# Patient Record
Sex: FEMALE | Race: WHITE | NOT HISPANIC OR LATINO | URBAN - METROPOLITAN AREA
[De-identification: names, ages, dates, MRNs, and addresses within clinical notes are randomized per-mention and may not be internally consistent; named-entity substitution may affect disease eponyms.]

---

## 2017-06-14 ENCOUNTER — OFFICE VISIT - RIVER FALLS (OUTPATIENT)
Dept: FAMILY MEDICINE | Facility: CLINIC | Age: 62
End: 2017-06-14

## 2017-06-14 ASSESSMENT — MIFFLIN-ST. JEOR: SCORE: 1213.56

## 2017-07-10 ENCOUNTER — AMBULATORY - RIVER FALLS (OUTPATIENT)
Dept: FAMILY MEDICINE | Facility: CLINIC | Age: 62
End: 2017-07-10

## 2017-07-11 LAB
CHOLEST SERPL-MCNC: 180 MG/DL (ref 125–200)
CHOLEST/HDLC SERPL: 2.6 {RATIO}
HDLC SERPL-MCNC: 69 MG/DL
LDLC SERPL CALC-MCNC: 99 MG/DL
NONHDLC SERPL-MCNC: 111 MG/DL
TRIGL SERPL-MCNC: 59 MG/DL

## 2017-08-14 ENCOUNTER — OFFICE VISIT - RIVER FALLS (OUTPATIENT)
Dept: FAMILY MEDICINE | Facility: CLINIC | Age: 62
End: 2017-08-14

## 2017-09-26 ENCOUNTER — OFFICE VISIT - RIVER FALLS (OUTPATIENT)
Dept: FAMILY MEDICINE | Facility: CLINIC | Age: 62
End: 2017-09-26

## 2017-09-26 ASSESSMENT — MIFFLIN-ST. JEOR: SCORE: 1168.2

## 2018-07-31 ENCOUNTER — OFFICE VISIT - RIVER FALLS (OUTPATIENT)
Dept: FAMILY MEDICINE | Facility: CLINIC | Age: 63
End: 2018-07-31

## 2018-07-31 ASSESSMENT — MIFFLIN-ST. JEOR: SCORE: 1168.2

## 2018-08-14 ENCOUNTER — AMBULATORY - RIVER FALLS (OUTPATIENT)
Dept: FAMILY MEDICINE | Facility: CLINIC | Age: 63
End: 2018-08-14

## 2018-08-14 ENCOUNTER — OFFICE VISIT - RIVER FALLS (OUTPATIENT)
Dept: FAMILY MEDICINE | Facility: CLINIC | Age: 63
End: 2018-08-14

## 2018-08-14 ASSESSMENT — MIFFLIN-ST. JEOR: SCORE: 1168.2

## 2018-08-28 ENCOUNTER — OFFICE VISIT - RIVER FALLS (OUTPATIENT)
Dept: FAMILY MEDICINE | Facility: CLINIC | Age: 63
End: 2018-08-28

## 2018-08-28 ASSESSMENT — MIFFLIN-ST. JEOR: SCORE: 1168.2

## 2018-10-22 ENCOUNTER — OFFICE VISIT - RIVER FALLS (OUTPATIENT)
Dept: FAMILY MEDICINE | Facility: CLINIC | Age: 63
End: 2018-10-22

## 2018-10-22 ASSESSMENT — MIFFLIN-ST. JEOR: SCORE: 1219.01

## 2019-05-06 ENCOUNTER — OFFICE VISIT - RIVER FALLS (OUTPATIENT)
Dept: FAMILY MEDICINE | Facility: CLINIC | Age: 64
End: 2019-05-06

## 2019-05-06 ASSESSMENT — MIFFLIN-ST. JEOR: SCORE: 1223.54

## 2020-10-05 ENCOUNTER — OFFICE VISIT - RIVER FALLS (OUTPATIENT)
Dept: FAMILY MEDICINE | Facility: CLINIC | Age: 65
End: 2020-10-05

## 2022-02-11 VITALS
WEIGHT: 168 LBS | TEMPERATURE: 97.8 F | SYSTOLIC BLOOD PRESSURE: 180 MMHG | BODY MASS INDEX: 32.98 KG/M2 | DIASTOLIC BLOOD PRESSURE: 100 MMHG | HEIGHT: 60 IN | HEART RATE: 60 BPM

## 2022-02-11 VITALS
HEART RATE: 104 BPM | HEIGHT: 60 IN | WEIGHT: 155.8 LBS | BODY MASS INDEX: 30.59 KG/M2 | OXYGEN SATURATION: 96 % | DIASTOLIC BLOOD PRESSURE: 92 MMHG | HEART RATE: 79 BPM | OXYGEN SATURATION: 98 % | HEIGHT: 60 IN | BODY MASS INDEX: 30.59 KG/M2 | WEIGHT: 155.8 LBS | HEIGHT: 60 IN | TEMPERATURE: 98 F | SYSTOLIC BLOOD PRESSURE: 144 MMHG | BODY MASS INDEX: 30.59 KG/M2 | WEIGHT: 155.8 LBS | TEMPERATURE: 97.2 F

## 2022-02-11 VITALS
SYSTOLIC BLOOD PRESSURE: 146 MMHG | BODY MASS INDEX: 32.79 KG/M2 | HEART RATE: 76 BPM | DIASTOLIC BLOOD PRESSURE: 108 MMHG | HEIGHT: 60 IN | WEIGHT: 167 LBS | TEMPERATURE: 96.8 F

## 2022-02-11 VITALS
DIASTOLIC BLOOD PRESSURE: 80 MMHG | OXYGEN SATURATION: 96 % | WEIGHT: 155.8 LBS | SYSTOLIC BLOOD PRESSURE: 124 MMHG | BODY MASS INDEX: 30.59 KG/M2 | HEART RATE: 92 BPM | HEIGHT: 60 IN | TEMPERATURE: 98.6 F

## 2022-02-11 VITALS
WEIGHT: 162.8 LBS | SYSTOLIC BLOOD PRESSURE: 124 MMHG | HEART RATE: 88 BPM | DIASTOLIC BLOOD PRESSURE: 78 MMHG | SYSTOLIC BLOOD PRESSURE: 112 MMHG | WEIGHT: 165.8 LBS | HEART RATE: 68 BPM | BODY MASS INDEX: 32.55 KG/M2 | DIASTOLIC BLOOD PRESSURE: 76 MMHG | BODY MASS INDEX: 31.79 KG/M2 | HEIGHT: 60 IN

## 2022-02-16 NOTE — PROGRESS NOTES
Patient:   CHEN ADAM            MRN: [id number]            FIN: [id number]               Age:   63 years     Sex:  Female     :  1955   Associated Diagnoses:   Changing pigmented skin lesion   Author:   Mehul Mckeon MD      Impression and Plan   Diagnosis     Changing pigmented skin lesion ([id number] L81.9).     Orders      (Selected)   Outpatient Orders  Ordered (Dispatched)  Tissue pathology* ([facility]): Specimen Type: Miscellaneous Specimen, Collection Date: 18 14:34:00 CDT  Completed  77884 shaving skin lesion 1 s/n/h/f/g diam 1.1-2.0 cm (Charge): Quantity: 1, Changing pigmented skin lesion.     Counseled:  Patient, Regarding diagnosis, Regarding treatment.       Visit Information      Date of Service: 2018 02:00 pm  Performing Location: [facility]  Encounter#: [id number]      Primary Care Provider (PCP):  Mehul Mckeon MD    NPI# [id number]   Visit type:  New symptom.    Accompanied by:  No one.    Source of history:  Self.    History limitation:  None.       Procedure   Biopsy procedure   Date/ Time:  2018 2:35:00 PM.     Confirmed: patient, procedure, side, site, safety procedures followed.     Performed by: Mehul Mckeon MD.     Informed consent: signed by patient.     Indication: abnormal physical findings, suspected malignancy.     Preparation and technique: skin prepped (in usual sterile fashion, with alcohol), sterile preparation of site, local anesthesia 1% lidocaine without epinephrine 2.0  ml, technique (shave biopsy, number 10 scalpel), hemostasis achieved using electrocautery.     Site #  1: back, size and depth (length  1.5  cm, width  1.2  cm, superficial), specimen sent to pathology in preservative.     Completion: 1  lesions biopsied, estimated blood loss minimal, dressing applied adhesive strip.     Procedure tolerated: well.     wound care instructions given.

## 2022-02-16 NOTE — PROGRESS NOTES
Patient:   CHEN ADAM            MRN: [id number]            FIN: [id number]               Age:   63 years     Sex:  Female     :  1955   Associated Diagnoses:   Cystitis   Author:   Mehul Mckeon MD      Impression and Plan   Diagnosis     Cystitis (JUZ91-BW N30.90).     Course:  Worsening.    Orders     Orders   Charges (Evaluation and Management):  69239 office outpatient visit 15 minutes (Charge) (Order): Quantity: 1, Cystitis.     Orders (Selected)   Outpatient Orders  Ordered (Dispatched)  Culture, Urine, Routine* ([facility]): Specimen Type: Urine (Clean Catch), Collection Date: 10/22/18 15:20:00 CDT  Completed  Urinalysis Complete (Request): Dysuria  Prescriptions  Prescribed  Macrobid 100 mg oral capsule: = 1 cap(s) ( 100 mg ), PO, BID, # 14 cap(s), 0 Refill(s), Type: Maintenance, Pharmacy: [facility], 1 cap(s) Oral bid,x7 day(s).        Visit Information      Date of Service: 10/22/2018 02:50 pm  Performing Location: [facility]  Encounter#: [id number]      Primary Care Provider (PCP):  Mehul Mckeon MD    NPI# [id number]   Visit type:  New symptom.    Accompanied by:  No one.    Source of history:  Self.    History limitation:  None.       Chief Complaint   Chief complaint discussed and confirmed correct.     10/22/2018 2:57 PM CDT   c/o urinary frequency & dysuria that started today        History of Present Illness             The patient presents with dysuria.  The dysuria is characterized by burning.  The severity of the dysuria is mild.  The dysuria is constant.  There are no modifying factors.  Associated symptoms consist of urinary frequency and urinary urgency.        Review of Systems   Constitutional:  Negative.    Eye:  Negative.    Ear/Nose/Mouth/Throat:  Negative.    Respiratory:  Negative.    Cardiovascular:  Negative.    Gastrointestinal:  Negative.    Genitourinary:  Dysuria.    Hematology/Lymphatics:  Negative.    Endocrine:  Negative.    Immunologic:   Negative.    Musculoskeletal:  Negative.    Integumentary:  Negative.    Neurologic:  Negative.    Psychiatric:  Negative.    All other systems reviewed and negative      Health Status   Allergies:    Allergic Reactions (Selected)  No Known Medication Allergies   Medications:  (Selected)   Prescriptions  Prescribed  Macrobid 100 mg oral capsule: = 1 cap(s) ( 100 mg ), PO, BID, # 14 cap(s), 0 Refill(s), Type: Maintenance, Pharmacy: [facility], 1 cap(s) Oral bid,x7 day(s)  buPROPion 150 mg/12 hours (SR) oral tablet, extended release: = 1 tab(s) ( 150 mg ), Oral, bid, # 60 tab(s), 0 Refill(s), Type: Maintenance, Pharmacy: [facility]  Documented Medications  Documented  Aspir 81: ( 81 mg ), po, daily, 0 Refill(s), Type: Maintenance  multivitamin: multivitamin, Supply, 0 Refill(s), Type: Maintenance   Problem list:    All Problems  Adenomatous colon polyp / SNOMED CT 8833323632 / Confirmed  Diverticulitis / SNOMED CT 9712706 / Confirmed  Obesity / SNOMED CT 0320985525 / Probable  Squamous cell carcinoma / SNOMED CT 1785193178 / Confirmed  Inactive: History of chickenpox / SNOMED CT 983225014  Resolved: Fracture of wrist / SNOMED CT 7526581352  Resolved: Pregnancy / SNOMED CT 070564562  Resolved: Pregnancy / SNOMED CT 621744556  Resolved: Pregnancy / SNOMED CT 625811215      Histories   Past Medical History:    Resolved  Fracture of wrist (8964260286): Onset in 2016 at 61 years.  Resolved.  Pregnancy (139075972):  Resolved in 1979 at 23 years.  Pregnancy (106428050):  Resolved in 1981 at 25 years.  Pregnancy (070697744):  Resolved in 1984 at 28 years.   Family History:    Prostate  Brother     Procedure history:    ORIF - Open reduction and internal fixation of fracture (SNOMED CT 409024785) performed by Albin Chawla MD on 2/25/2015 at 59 Years.  Comments:  3/5/2015 9:45 AM - Gemma Cid  Right 2 part intra-articular distal radius.  Colonoscopy (SNOMED CT 112942986) performed by Reynaldo Landeros MD on  11/5/2014 at 59 Years.  Comments:  11/7/2014 10:20 AM - Reynaldo Landeros MD  Advanced adenoma 20 cm. 1 hyperplastic.  Rectosigmoid diverticulosis. Hemorrhoids. Colonoscopy in 3 years.  Carpal tunnel decompression (SNOMED CT 1832898449) in 2004 at 49 Years.   Social History:        Alcohol Assessment: Current                     Comments:                      06/15/2017 - Emerald Nicole                     Rarely      Tobacco Assessment: Current                     Comments:                      06/15/2017 - Emerald Nicole                     42 years      Substance Abuse Assessment: Denies Substance Abuse            Never      Employment and Education Assessment            Employed, Work/School description: [employer].      Home and Environment Assessment            Marital status:  (Living together).  Spouse/Partner name: Mehul Sabillon.  Lives with Self, Spouse.  3               children.  Living situation: Home/Independent.      Exercise and Physical Activity Assessment: Occasional exercise                     Comments:                      06/15/2017 - Emerald Nicole                     Rarely        Physical Examination   Vital signs reviewed  and within acceptable limits    Vital Signs   10/22/2018 2:57 PM CDT Temperature Tympanic 96.8 DegF  LOW    Peripheral Pulse Rate 76 bpm    Pulse Site Radial artery    HR Method Manual    Systolic Blood Pressure 146 mmHg  HI    Diastolic Blood Pressure 108 mmHg  HI    Mean Arterial Pressure 121 mmHg    BP Site Right arm    BP Method Manual      Measurements from flowsheet : Measurements   10/22/2018 2:57 PM CDT Height Measured - Standard 60 in    Weight Measured - Standard 167 lb    BSA 1.79 m2    Body Mass Index 32.61 kg/m2  HI      General:  No acute distress.    Respiratory:  Lungs are clear to auscultation, Respirations are non-labored, Breath sounds are equal, Symmetrical chest wall expansion.    Cardiovascular:  Normal rate, Regular rhythm, No murmur, No gallop,  Good pulses equal in all extremities, Normal peripheral perfusion, No edema.    Gastrointestinal:  Soft, Non-tender, Non-distended, Normal bowel sounds, No organomegaly.    Genitourinary:  No costovertebral angle tenderness.    Integumentary:  Warm, Dry, Pink.    Neurologic:  Alert, Oriented.    Psychiatric:  Cooperative, Appropriate mood & affect.       Review / Management   Results review:  Lab results   10/22/2018 3:08 PM CDT Urine Color Urine Dipstick Yellow    Urine Appearance Urine Dipstick Slightly cloudy    pH Urine Dipstick 5.5    Specific Gravity Urine Dipstick < 1.005    Glucose Urine Dipstick Negative    Bilirubin Urine Dipstick Negative    Ketones Urine Dipstick Negative    Blood Urine Dipstick Small    Protein Urine Dipstick 100 mg/dl    Nitrite Urine Dipstick Negative    Leukocyte Esterase Urine Dipstick Trace    Urobilinogen Urine Dipstick 0.2 mg/dl    POC Test Comments POC Test Comments   .

## 2022-02-16 NOTE — PROGRESS NOTES
Patient:   CHEN ADAM            MRN: [id number]            FIN: [id number]               Age:   63 years     Sex:  Female     :  1955   Associated Diagnoses:   Left knee pain   Author:   Mehul Mckeon MD      Impression and Plan   Diagnosis     Left knee pain (UJS00-TO M25.562).     Course:  Worsening.    Plan:  Rest, Ice, Compression and Elevation, Ibuprofen PRN.    Orders     Orders   Charges (Evaluation and Management):  94777 office outpatient visit 15 minutes (Charge) (Order): Quantity: 1, Left knee pain.     Orders (Selected)   Outpatient Orders  Ordered  XR Knee AP/Lat Left (Request): Left knee pain.        Visit Information      Date of Service: 2019 02:57 pm  Performing Location: [facility]  Encounter#: [id number]      Primary Care Provider (PCP):  Mehul Mckeon MD    NPI# [id number]   Visit type:  New symptom.    Accompanied by:  No one.    Source of history:  Self.    History limitation:  None.       Chief Complaint   2019 3:04 PM CDT     c/o left knee pain x 2 months        History of Present Illness             The patient presents with a knee problem.  The location of the knee problem is localized, the left knee, medial aspect.  The knee problem is characterized by aching.  The severity of the knee problem is moderate.  The timing/course of symptom(s) associated with the knee problem is episodic and fluctuates in intensity.  The knee problem has lasted for 2 month(s).  Radiation of pain: none.  The context of the knee problem: occurred in association with arthritis.  There are no modifying factors.  Associated symptoms consist of none.        Review of Systems   Constitutional:  Negative.    Eye:  Negative.    Ear/Nose/Mouth/Throat:  Negative.    Respiratory:  Negative.    Cardiovascular:  Negative.    Gastrointestinal:  Negative.    Genitourinary:  Negative.    Hematology/Lymphatics:  Negative.    Endocrine:  Negative.    Immunologic:  Negative.     Musculoskeletal:  Negative.    Integumentary:  Negative.    Neurologic:  Negative.    Psychiatric:  Negative.    All other systems reviewed and negative      Health Status   Allergies:    Allergic Reactions (Selected)  No Known Medication Allergies   Medications:  (Selected)   Prescriptions  Prescribed  buPROPion 150 mg/12 hours (SR) oral tablet, extended release: = 1 tab(s) ( 150 mg ), Oral, bid, # 60 tab(s), 0 Refill(s), Type: Maintenance, Pharmacy: [facility]  Documented Medications  Documented  Aspir 81: ( 81 mg ), po, daily, 0 Refill(s), Type: Maintenance  multivitamin: multivitamin, Supply, 0 Refill(s), Type: Maintenance   Problem list:    All Problems (Selected)  Adenomatous colon polyp / SNOMED CT 3178885158 / Confirmed  Diverticulitis / SNOMED CT 4404548 / Confirmed  Obesity / SNOMED CT 0793340910 / Probable  Squamous cell carcinoma / SNOMED CT 3958528156 / Confirmed      Histories   Past Medical History:    Resolved  Fracture of wrist (5873504297): Onset in 2016 at 61 years.  Resolved.  Pregnancy (325880154):  Resolved in 1979 at 23 years.  Pregnancy (517757182):  Resolved in 1981 at 25 years.  Pregnancy (878981640):  Resolved in 1984 at 28 years.   Family History:    Prostate  Brother     Procedure history:    ORIF - Open reduction and internal fixation of fracture (SNOMED CT 092358270) performed by Albin Chawla MD on 2/25/2015 at 59 Years.  Comments:  3/5/2015 9:45 AM CST - Gemma Cid  Right 2 part intra-articular distal radius.  Colonoscopy (SNOMED CT 296411140) performed by Reynaldo Landeros MD on 11/5/2014 at 59 Years.  Comments:  11/7/2014 10:20 AM Reynaldo Ozuna MD  Advanced adenoma 20 cm. 1 hyperplastic.  Rectosigmoid diverticulosis. Hemorrhoids. Colonoscopy in 3 years.  Carpal tunnel decompression (SNOMED CT 9543362521) in 2004 at 49 Years.   Social History:        Alcohol Assessment: Current                     Comments:                      06/15/2017 - Emerald Nicole                      Rarely      Tobacco Assessment: Current                     Comments:                      06/15/2017 - Emerald Nicole                     42 years      Substance Abuse Assessment: Denies Substance Abuse            Never      Employment and Education Assessment            Employed, Work/School description: [employer].      Home and Environment Assessment            Marital status:  (Living together).  Spouse/Partner name: Mehul Sabillon.  Lives with Self, Spouse.  3               children.  Living situation: Home/Independent.      Exercise and Physical Activity Assessment: Occasional exercise                     Comments:                      06/15/2017 - Emerald Nicole                     Rarely      Physical Examination   Vital Signs   5/6/2019 3:04 PM CDT Temperature Tympanic 97.8 DegF  LOW    Peripheral Pulse Rate 60 bpm    Pulse Site Radial artery    HR Method Manual    Systolic Blood Pressure 180 mmHg  HI    Diastolic Blood Pressure 100 mmHg  HI    Mean Arterial Pressure 127 mmHg    BP Site Right arm    BP Method Manual      Measurements from flowsheet : Measurements   5/6/2019 3:04 PM CDT Height Measured - Standard 60 in    Weight Measured - Standard 168 lb    BSA 1.79 m2    Body Mass Index 32.81 kg/m2  HI      General:  Alert and oriented X 3, No acute distress, Warm, Pink, Intact.         Appearance: Within normal limits, Well nourished, Calm.         Hydration: Within normal limits.         Psych: Within normal limits, Appropriate mood and affect, Cooperative, Normal judgment.    Musculoskeletal:       Lower extremity exam: Knee ( Left, Not displaced, No deformity, No erythema, No ecchymosis, No effusion, No mass, No nodule, No swelling, Tenderness, No wound, No crepitus, No numbness, Strength  5 /5, Normal range of motion ).       Review / Management   Radiology results   X-ray, Two views left knee , Reveals no acute disease process, Radiograph(s) result as interpreted by ordering  provider reviewed with patient.  Radiologist will also interpret the radiograph(s) and patient will be informed if result is modified when both interpretations are compared.

## 2022-02-16 NOTE — LETTER
(Inserted Image. Unable to display)   March 21, 2020      CHEN ADAM   [address] [place] [id number]        Dear CHEN,      Thank you for selecting [facility] ([facility]viously [facility]) for your healthcare needs.      It has come to my attention that you are overdue for a colonoscopy. It is is important that you have a colonoscopy because of the polyps on your last colonoscopy. Please contact Dr. Mckeon or myself to arrange a colonoscopy.          Please contact me or my assistant at [phone] you have any questions or concerns.     Sincerely,        Reynaldo Landeros MD

## 2022-02-16 NOTE — PROGRESS NOTES
Patient:   CHEN ADAM            MRN: [id number]            FIN: [id number]               Age:   63 years     Sex:  Female     :  1955   Associated Diagnoses:   Squamous cell carcinoma   Author:   Mehul Mckeon MD      Impression and Plan   Diagnosis     Squamous cell carcinoma (JRE57-FS C44.92).     Orders     Orders (Selected)   Outpatient Orders  Completed   Suture/Staple Removal, Simple (Charge): Quantity: 1, Squamous cell carcinoma.     Counseled:  Patient, Regarding diagnosis, Regarding treatment.       Procedure   Staple/ Suture removal procedure   Date/ Time:  2018 3:31:00 PM.     Confirmed: patient, procedure, side, site, safety procedures followed.     Performed by: Mehul Mckeon MD.     Wound assessment:: normal skin color, characteristics well healed, discharge none, inflammatory changes none, course (improving, progressing as expected).     Insertion/ Removal: Date sutured  8/15/2018, Number of sutures removed  10.     Procedure tolerated: well.     Complications: none.

## 2022-02-16 NOTE — NURSING NOTE
Comprehensive Intake Entered On:  5/6/2019 3:11 PM CDT    Performed On:  5/6/2019 3:04 PM CDT by Mariana Johnson MA               Summary   Chief Complaint :   c/o left knee pain x 2 months   Weight Measured :   168 lb(Converted to: 168 lb 0 oz, 76.20 kg)    Height Measured :   60 in(Converted to: 5 ft 0 in, 152.40 cm)    Body Mass Index :   32.81 kg/m2 (HI)    Body Surface Area :   1.79 m2   Systolic Blood Pressure :   180 mmHg (HI)    Diastolic Blood Pressure :   100 mmHg (HI)    Mean Arterial Pressure :   127 mmHg   Peripheral Pulse Rate :   60 bpm   BP Site :   Right arm   Pulse Site :   Radial artery   BP Method :   Manual   HR Method :   Manual   Temperature Tympanic :   97.8 DegF(Converted to: 36.6 DegC)  (LOW)    Mariana Johnson MA - 5/6/2019 3:04 PM CDT   Health Status   Allergies Verified? :   Yes   Medication History Verified? :   Yes   Medical History Verified? :   Yes   Pre-Visit Planning Status :   Completed   Tobacco Use? :   Current every day smoker   Mariana Johnson MA - 5/6/2019 3:04 PM CDT   Consents   Consent for Immunization Exchange :   Consent Granted   Consent for Immunizations to Providers :   Consent Granted   Mariana Johnson MA - 5/6/2019 3:04 PM CDT   Meds / Allergies   (As Of: 5/6/2019 3:11:01 PM CDT)   Allergies (Active)   No Known Medication Allergies  Estimated Onset Date:   Unspecified ; Created By:   Irma Hutchison CMA; Reaction Status:   Active ; Category:   Drug ; Substance:   No Known Medication Allergies ; Type:   Allergy ; Updated By:   Irma Hutchison CMA; Reviewed Date:   5/6/2019 3:06 PM CDT        Medication List   (As Of: 5/6/2019 3:11:01 PM CDT)   Prescription/Discharge Order    nitrofurantoin  :   nitrofurantoin ; Status:   Processing ; Ordered As Mnemonic:   Macrobid 100 mg oral capsule ; Ordering Provider:   Mehul Mckeon MD; Action Display:   Complete ; Catalog Code:   nitrofurantoin ; Order Dt/Tm:   5/6/2019 3:07:01 PM          buPROPion  :   buPROPion ; Status:    Prescribed ; Ordered As Mnemonic:   buPROPion 150 mg/12 hours (SR) oral tablet, extended release ; Simple Display Line:   150 mg, 1 tab(s), Oral, bid, 60 tab(s), 0 Refill(s) ; Ordering Provider:   Mehul Mckeon MD; Catalog Code:   buPROPion ; Order Dt/Tm:   10/1/2018 1:54:19 PM            Home Meds    Miscellaneous Prescription  :   Miscellaneous Prescription ; Status:   Documented ; Ordered As Mnemonic:   multivitamin ; Simple Display Line:   0 Refill(s) ; Catalog Code:   Miscellaneous Prescription ; Order Dt/Tm:   8/14/2017 9:50:58 AM          aspirin  :   aspirin ; Status:   Documented ; Ordered As Mnemonic:   Aspir 81 ; Simple Display Line:   81 mg, po, daily, 0 Refill(s) ; Catalog Code:   aspirin ; Order Dt/Tm:   6/14/2017 11:10:27 AM

## 2022-02-16 NOTE — PROGRESS NOTES
Patient:   CHEN ADAM            MRN: [id number]            FIN: [id number]               Age:   63 years     Sex:  Female     :  1955   Associated Diagnoses:   Squamous cell carcinoma   Author:   Mehul Mckeon MD      Impression and Plan   Diagnosis     Squamous cell carcinoma (WTN99-HJ C44.92).     Orders     Orders (Selected)   Outpatient Orders  Ordered (In Transit)  Tissue pathology* ([facility]): Specimen Type: Miscellaneous Specimen, Collection Date: 18 11:51:00 CDT  Completed  28354 excision tumor soft tis back/flank subq 3 cm/> (Charge): Quantity: 1, Squamous cell carcinoma.        Procedure   Dermatology Surgical Procedure   Date/ Time:  2018 11:54:00 AM.     Confirmed: patient, procedure, side, and site are correct, safety procedures followed.     Informed consent: signed by patient.     Performed by: WICHO Lyon.     Indication: remove lesion.     Procedure performed: Surgical Excision: lesion is 1.5 cm diameter. elipse is 4 cm x 2.5 cm.  Site and number of lesions: Right middle back  Skin preparation: Povidone Iodine  Anesthesia: Xylocaine 1% - 3ml  Procedure: Eliptical incision with #15 scalpel and disection with Iris scissors. depth of excision down to fatty layer.  Hemostasis: Electrocautery  Pathology: Preservative  Closure: nine interrupted 3-0 Ethilon sutures  Dressing: sterile bandage     .         (Inserted Image. Unable to display) .     Complications: none.     Procedure tolerated: well.     suture removal 10 days.     wound care instructions given.

## 2022-02-16 NOTE — PROGRESS NOTES
Patient:   CHEN ADAM            MRN: [id number]            FIN: [id number]               Age:   62 years     Sex:  Female     :  1955   Associated Diagnoses:   Diverticulitis   Author:   Mehul Mckeon MD      Impression and Plan   Diagnosis     Diverticulitis (WMR38-WO K57.32).     ongoing left abdominal discomfort since having diverticulitis in mid August.     Course:  Worsening.    Plan:  consider colonoscopy if CT does not reveal cause of discomfort.    Orders     Orders   Charges (Evaluation and Management):  49329 office outpatient visit 25 minutes (Charge) (Order): Quantity: 1, Diverticulitis.     Orders (Selected)   Outpatient Orders  Ordered  CT Abdomen and Pelvis w/contrast (Request): Diverticulitis.        Visit Information   Visit type:  New symptom.    Accompanied by:  No one.    Source of history:  Self.    History limitation:  None.       Chief Complaint   2017 8:43 AM CDT    Pt here for f/u on diverticulitis        History of Present Illness             The patient presents with abdominal pain.  The abdominal pain is located in the left lower quadrant and left flank.  The abdominal pain is described as aching.  The severity of the abdominal pain is moderate.  The abdominal pain is constant.  The abdominal pain has lasted for 6 week(s).  Radiation of pain: to the left flank.  There are no modifying factors.  Associated symptoms consist of constipation, loss of appetite and 10 lb. weight loss, denies fever, denies nausea, denies vomiting, denies diarrhea, denies abdominal distention and denies dysuria.  patient has history of diverticulosis on colonoscopy.        Review of Systems   Constitutional:  Fatigue, Decreased activity.    Eye:  Negative.    Ear/Nose/Mouth/Throat:  Negative.    Respiratory:  Negative.    Cardiovascular:  Negative.    Gastrointestinal:  Constipation, Loss of appetite.         Abdominal pain: Left, The severity is moderate.    Genitourinary:  Negative.     Hematology/Lymphatics:  Negative.    Endocrine:  Negative.    Immunologic:  Negative.    Musculoskeletal:  Negative.    Integumentary:  Negative.    Neurologic:  Negative.    Psychiatric:  Negative.    All other systems reviewed and negative      Health Status   Allergies:    Allergic Reactions (Selected)  No Known Medication Allergies   Medications:  (Selected)   Prescriptions  Prescribed  Wellbutrin  mg/12 hours oral tablet, extended release: 1 tab(s) ( 150 mg ), po, bid, # 60 tab(s), 5 Refill(s), Type: Maintenance, Pharmacy: [facility], 1 tab(s) po bid  Documented Medications  Documented  Aspir 81: ( 81 mg ), po, daily, 0 Refill(s), Type: Maintenance  multivitamin: multivitamin, Supply, 0 Refill(s), Type: Maintenance   Problem list:    All Problems  Adenomatous colon polyp / SNOMED CT 7044068453 / Confirmed  Diverticulitis / SNOMED CT 6339750 / Confirmed  Obesity / SNOMED CT 5955929907 / Probable  Inactive: History of chickenpox / SNOMED CT 431122476  Resolved: Fracture of wrist / SNOMED CT 0374354197  Resolved: Pregnancy / SNOMED CT 209897833  Resolved: Pregnancy / SNOMED CT 632869144  Resolved: Pregnancy / SNOMED CT 851637070      Histories   Past Medical History:    Resolved  Fracture of wrist (1885205622): Onset in 2016 at 61 years.  Resolved.  Pregnancy (828342343):  Resolved in 1979 at 23 years.  Pregnancy (982311680):  Resolved in 1981 at 25 years.  Pregnancy (889125965):  Resolved in 1984 at 28 years.   Family History:    Prostate  Brother     Procedure history:    ORIF - Open reduction and internal fixation of fracture (SNOMED CT 129194610) performed by Albin Chawla MD on 2/25/2015 at 59 Years.  Comments:  3/5/2015 9:45 AM - Gemma Cid  Right 2 part intra-articular distal radius.  Colonoscopy (SNOMED CT 429529041) performed by Reynaldo Landeros MD on 11/5/2014 at 59 Years.  Comments:  11/7/2014 10:20 AM - Reynaldo Landeros MD  Advanced adenoma 20 cm. 1 hyperplastic.  Rectosigmoid  diverticulosis. Hemorrhoids. Colonoscopy in 3 years.  Carpal tunnel decompression (SNOMED CT 3186474543) in 2004 at 49 Years.      Physical Examination   Vital Signs   9/26/2017 8:43 AM CDT Temperature Tympanic 98.6 DegF    Peripheral Pulse Rate 92 bpm    Pulse Site Radial artery    Systolic Blood Pressure 124 mmHg    Diastolic Blood Pressure 80 mmHg    Mean Arterial Pressure 95 mmHg    BP Site Right arm    Oxygen Saturation 96 %      Measurements from flowsheet : Measurements   9/26/2017 8:43 AM CDT Height Measured - Standard 60 in    Weight Measured - Standard 155.8 lb    BSA 1.73 m2    Body Mass Index 30.42 kg/m2      General:  No acute distress.    Neck:  Supple, No lymphadenopathy, No thyromegaly.    Respiratory:  Lungs are clear to auscultation, Respirations are non-labored, Breath sounds are equal, Symmetrical chest wall expansion.    Cardiovascular:  Normal rate, Regular rhythm, No murmur, No gallop, Good pulses equal in all extremities, Normal peripheral perfusion, No edema.    Gastrointestinal:  Soft, Non-tender, Non-distended, Normal bowel sounds, No organomegaly, No gaurding or rebound or percussion tenderness.    Integumentary:  Warm, Dry, Pink.    Neurologic:  Alert, Oriented.    Psychiatric:  Cooperative, Appropriate mood & affect.

## 2022-02-16 NOTE — NURSING NOTE
Pt informed of normal echo results per JENA and she voiced understanding. She will call or return to the clinic with further questions or concerns

## 2022-02-16 NOTE — TELEPHONE ENCOUNTER
---------------------  From: Aye Lugo   To: Mehul Mckeon MD;     Sent: 10/5/2020 11:14:58 AM CDT  Subject: Scheduling Management     PT MISSED APPT WITH MEHUL MCKEON, SPOT ON FACE

## 2022-02-16 NOTE — PROGRESS NOTES
Patient:   CHEN ADAM            MRN: [id number]            FIN: [id number]               Age:   62 years     Sex:  Female     :  1955   Associated Diagnoses:   Well adult exam; Chest pain   Author:   Mehul Mckeon MD      Impression and Plan   Diagnosis     Well adult exam (RTW04-GN Z00.00).     Course:  Progressing as expected.    Plan:    Cancer Screening: Mammogram scheduled, PAP and colonoscopy UTD  Immunizations: UTD  Laboratory screening: UTD.    Orders     Orders   Charges (Evaluation and Management):  00277 periodic preventive med est patient 40-64yrs (Charge) (Order): Quantity: 1, Well adult exam.     Orders (Selected)   Outpatient Orders  Ordered  MA Mammogram Routine Screening Bilateral (Request): Breast cancer screening.     Diagnosis     Chest pain (ERD25-ET R07.9).     Course:  Worsening.    Orders     Orders (Selected)   Outpatient Orders  Ordered  Nuclear Stress Test, Treadmill (Request): Chest pain.        Visit Information   Visit type:  Annual exam.    Accompanied by:  No one.    Source of history:  Self.    History limitation:  None.       Chief Complaint   Chief complaint discussed and confirmed correct.     2017 11:07 AM CDT   Pt here for px        Well Adult History   Well Adult History             The patient presents for well adult exam.  The patient's general health status is described as good.  The patient's diet is described as balanced.  Exercise: occasional.  Associated symptoms consist of none.  Medical encounters: none.  Compliance problems: none.        Review of Systems   Constitutional:  Negative.    Eye:  Negative.    Ear/Nose/Mouth/Throat:  Negative.    Respiratory:  Negative.    Cardiovascular:       Chest pain: Bilateral, Anterior, with exertion.    Gastrointestinal:  Negative.    Genitourinary:  Negative.    Hematology/Lymphatics:  Negative.    Endocrine:  Negative.    Immunologic:  Negative.    Musculoskeletal:  Negative.    Integumentary:  Negative.     Neurologic:  Negative.    Psychiatric:  Negative.    All other systems reviewed and negative      Health Status   Allergies:    Allergic Reactions (Selected)  No Known Medication Allergies   Medications:  (Selected)   Documented Medications  Documented  Aspir 81: ( 81 mg ), po, daily, 0 Refill(s), Type: Maintenance   Problem list:    All Problems  Adenomatous colon polyp / SNOMED CT 2548471486 / Confirmed  Obesity / SNOMED CT 0273943653 / Probable      Histories   Past Medical History:    No active or resolved past medical history items have been selected or recorded.   Family History:    No family history items have been selected or recorded.   Procedure history:    ORIF - Open reduction and internal fixation of fracture (SNOMED CT 693831863) performed by Albin Chawla MD on 2/25/2015 at 59 Years.  Comments:  3/5/2015 9:45 AM - Gemma Cid  Right 2 part intra-articular distal radius.  Colonoscopy (SNOMED CT 946723474) performed by Reynaldo Landeros MD on 11/5/2014 at 59 Years.  Comments:  11/7/2014 10:20 AM - Reynaldo Landeros MD  Advanced adenoma 20 cm. 1 hyperplastic.  Rectosigmoid diverticulosis. Hemorrhoids. Colonoscopy in 3 years.  Carpal tunnel decompression (SNOMED CT 6326889313) in 2004 at 49 Years.   Social History:        Alcohol Assessment: Denies Alcohol Use      Tobacco Assessment: Past      Substance Abuse Assessment: Denies Substance Abuse      Employment and Education Assessment            Employed      Home and Environment Assessment            Marital status:  (Living together).  Lives with Self, Spouse.  3 children.  Living situation:               Home/Independent.        Physical Examination   Vital signs reviewed  and within acceptable limits    Vital Signs   6/14/2017 11:07 AM CDT Peripheral Pulse Rate 68 bpm    Pulse Site Radial artery    HR Method Manual    Systolic Blood Pressure 124 mmHg    Diastolic Blood Pressure 78 mmHg    Mean Arterial Pressure 93 mmHg    BP Site Right arm     BP Method Electronic      Measurements from flowsheet : Measurements   6/14/2017 11:07 AM CDT Height Measured - Standard 60 in    Weight Measured - Standard 165.8 lb    BSA 1.78 m2    Body Mass Index 32.38 kg/m2      General:  Alert and oriented, No acute distress.    Eye:  Pupils are equal, round and reactive to light, Extraocular movements are intact, Normal conjunctiva.    HENT:  Normocephalic, Tympanic membranes are clear, Normal hearing, Oral mucosa is moist, No pharyngeal erythema.    Neck:  Supple, Non-tender, No carotid bruit, No jugular venous distention, No lymphadenopathy, No thyromegaly.    Respiratory:  Lungs are clear to auscultation, Respirations are non-labored, Breath sounds are equal, Symmetrical chest wall expansion, No chest wall tenderness.    Cardiovascular:  Normal rate, Regular rhythm, No murmur, No gallop, Good pulses equal in all extremities, Normal peripheral perfusion, No edema.    Breast:  No mass, No tenderness, No discharge.    Gastrointestinal:  Soft, Non-tender, Non-distended, Normal bowel sounds, No organomegaly.    Genitourinary:  No costovertebral angle tenderness, No inguinal tenderness, No urethral discharge, No lesions, External genitalia normal to inspection.  Vagina and cervix appear normal with speculum examination., uterus normal size and mobility without mass or tenderness. Adnexa without mass or tenderness..    Lymphatics:  No lymphadenopathy neck, axilla, groin.    Musculoskeletal:  Normal range of motion, Normal strength, No tenderness, No swelling, No deformity, Normal gait.    Integumentary:  Warm, Dry, Pink.    Neurologic:  Normal sensory, Normal motor function, No focal deficits, Cranial Nerves II-XII are grossly intact, Normal deep tendon reflexes.    Psychiatric:  Cooperative, Appropriate mood & affect, Normal judgment.

## 2022-02-16 NOTE — PROGRESS NOTES
Patient:   CHEN ADAM            MRN: [id number]            FIN: [id number]               Age:   62 years     Sex:  Female     :  1955   Associated Diagnoses:   Diverticulitis   Author:   Mehul Mckeon MD      Impression and Plan   Diagnosis     Diverticulitis (IAQ12-PM K57.32).     Plan:  High fiber diet recommended.    Orders     Orders (Selected)   Prescriptions  Prescribed  Augmentin 875 mg-125 mg oral tablet: 1 tab(s), po, bidac, # 14 tab(s), 0 Refill(s), Type: Maintenance, Pharmacy: [facility], 1 tab(s) po bidac,x7 day(s)  metroNIDAZOLE 500 mg oral tablet: 1 tab(s) ( 500 mg ), PO, q8hr, # 21 tab(s), 0 Refill(s), Type: Maintenance, Pharmacy: [facility], 1 tab(s) po q8 hrs,x7 day(s).        Visit Information   Visit type:  New symptom.    Accompanied by:  No one.    Source of history:  Self.    History limitation:  None.       Chief Complaint   2017 9:48 AM CDT    Pt here to discuss diverticulosis        History of Present Illness             The patient presents with abdominal pain.  The abdominal pain is located in the left lower quadrant.  The abdominal pain is described as aching.  The severity of the abdominal pain is moderate.  The abdominal pain is constant.  The abdominal pain has lasted for 1 week(s).  There are no modifying factors.  Associated symptoms consist of denies fever, denies nausea, denies vomiting, denies diarrhea, denies constipation, denies abdominal distention and denies dysuria.  patient has history of diverticulosis on colonoscopy.        Review of Systems   Constitutional:  Negative.    Eye:  Negative.    Ear/Nose/Mouth/Throat:  Negative.    Respiratory:  Negative.    Cardiovascular:  Negative.    Gastrointestinal:  Negative except as documented in history of present illness.    Genitourinary:  Negative.    Hematology/Lymphatics:  Negative.    Endocrine:  Negative.    Immunologic:  Negative.    Musculoskeletal:  Negative.    Integumentary:  Negative.     Neurologic:  Negative.    Psychiatric:  Negative.    All other systems reviewed and negative      Health Status   Allergies:    Allergic Reactions (Selected)  No Known Medication Allergies   Medications:  (Selected)   Prescriptions  Prescribed  Augmentin 875 mg-125 mg oral tablet: 1 tab(s), po, bidac, # 14 tab(s), 0 Refill(s), Type: Maintenance, Pharmacy: [facility], 1 tab(s) po bidac,x7 day(s)  Wellbutrin  mg/12 hours oral tablet, extended release: 1 tab(s) ( 150 mg ), po, bid, # 60 tab(s), 5 Refill(s), Type: Maintenance, Pharmacy: [facility], 1 tab(s) po bid  metroNIDAZOLE 500 mg oral tablet: 1 tab(s) ( 500 mg ), PO, q8hr, # 21 tab(s), 0 Refill(s), Type: Maintenance, Pharmacy: [facility], 1 tab(s) po q8 hrs,x7 day(s)  Documented Medications  Documented  Aspir 81: ( 81 mg ), po, daily, 0 Refill(s), Type: Maintenance  multivitamin: multivitamin, Supply, 0 Refill(s), Type: Maintenance   Problem list:    All Problems (Selected)  Adenomatous colon polyp / SNOMED CT 0862703389 / Confirmed  Obesity / SNOMED CT 6751430370 / Probable      Histories   Past Medical History:    Resolved  Fracture of wrist (9780471769): Onset in 2016 at 61 years.  Resolved.  Pregnancy (073294786):  Resolved in 1979 at 23 years.  Pregnancy (986227407):  Resolved in 1981 at 25 years.  Pregnancy (435429083):  Resolved in 1984 at 28 years.   Family History:    Prostate  Brother     Procedure history:    ORIF - Open reduction and internal fixation of fracture (SNOMED CT 878199042) performed by Albin Chawla MD on 2/25/2015 at 59 Years.  Comments:  3/5/2015 9:45 AM - Gemma Cid  Right 2 part intra-articular distal radius.  Colonoscopy (SNOMED CT 000984427) performed by Reynaldo Landeros MD on 11/5/2014 at 59 Years.  Comments:  11/7/2014 10:20 AM - Reynaldo Landeros MD  Advanced adenoma 20 cm. 1 hyperplastic.  Rectosigmoid diverticulosis. Hemorrhoids. Colonoscopy in 3 years.  Carpal tunnel decompression (SNOMED CT  6029972800) in 2004 at 49 Years.      Physical Examination   Vital Signs   8/14/2017 9:48 AM CDT Peripheral Pulse Rate 88 bpm    Pulse Site Radial artery    Systolic Blood Pressure 112 mmHg    Diastolic Blood Pressure 76 mmHg    Mean Arterial Pressure 88 mmHg    BP Site Right arm    BP Method Manual      Measurements from flowsheet : Measurements   8/14/2017 9:48 AM CDT    Weight Measured - Standard                162.8 lb     General:  No acute distress.    Neck:  Supple, No lymphadenopathy, No thyromegaly.    Respiratory:  Lungs are clear to auscultation, Respirations are non-labored, Breath sounds are equal, Symmetrical chest wall expansion.    Cardiovascular:  Normal rate, Regular rhythm, No murmur, No gallop, Good pulses equal in all extremities, Normal peripheral perfusion, No edema.    Gastrointestinal:  Soft, Non-distended, Normal bowel sounds, No organomegaly, mild tenderness to palpation LLQ, No gaurding or rebound or percussion tenderness.    Integumentary:  Warm, Dry, Pink.    Neurologic:  Alert, Oriented.    Psychiatric:  Cooperative, Appropriate mood & affect.

## 2022-06-16 NOTE — PROCEDURES
Accession Number:       [id number]  PATHOLOGIST:     Yesi Villarreal MD Board Certified in Anatomic Pathology and Clinical Pathology (electronic signature)  A SOURCE:     Skin, middle back, shave excision  A GROSS DESCRIPTION:     See comment       Specimen is received in formalin, labeled with       multiple patient identifier(s) and consists of       one piece of a skin irregular measuring 1.4 x 1.2       x 0.1 cm, tan-gray in color, with a pigmented       lesion measuring 1.0 x 1.0 cm, yellow-white in       color. The margins are inked green. The specimen       is serially sectioned and entirely submitted in       two cassettes.       Cassette No.1- Both tips.       Cassette No.2- Remaining tissue.         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  A COMMENT:     Appropriate clinical treatment is recommended.

## 2022-06-16 NOTE — PROCEDURES
Accession Number:       [id number]  CLINICAL INFORMATION:     Previous biopsy of squamous cell carcinoma in situ  PATHOLOGIST:     Yesi Villarreal MD Board Certified in Anatomic Pathology and Clinical Pathology (electronic signature)  A SOURCE:     Skin, right mid back, re-excision  A GROSS DESCRIPTION:     See comment       Specimen is received in formalin, labeled with       multiple patient identifier(s) and consists of       one piece of a skin ellipse measuring 2.8 x 2.0 x       0.5 cm, tan-gray in color, with a pigmented       lesion measuring 1.5 x 1.0 cm, tan-brown in       color. The margins are inked green. The specimen       is serially sectioned and entirely submitted in       three cassettes.       Cassette No.1- Both tips.       Cassette No.2 to No.3- Remaining tissue.         Gross exam(s) performed at: [facility] [address] [id number]         : DONOVAN HOUGH MD  A DIAGNOSIS:     Small focus of residual squamous cell carcinoma in situ; margins are free. Epidermal crust with subjacent dermal acute and chronic inflammation, consistent with prior biopsy.